# Patient Record
Sex: MALE | Race: WHITE | NOT HISPANIC OR LATINO | URBAN - METROPOLITAN AREA
[De-identification: names, ages, dates, MRNs, and addresses within clinical notes are randomized per-mention and may not be internally consistent; named-entity substitution may affect disease eponyms.]

---

## 2023-04-10 ENCOUNTER — HOSPITAL ENCOUNTER (OUTPATIENT)
Dept: GENERAL RADIOLOGY | Facility: HOSPITAL | Age: 68
Discharge: HOME | End: 2023-04-10
Payer: COMMERCIAL

## 2023-04-10 ENCOUNTER — PRE-ADMISSION TESTING (OUTPATIENT)
Dept: PREADMISSION TESTING | Facility: HOSPITAL | Age: 68
End: 2023-04-10
Payer: COMMERCIAL

## 2023-04-10 VITALS
WEIGHT: 315 LBS | TEMPERATURE: 98.2 F | OXYGEN SATURATION: 98 % | HEIGHT: 73 IN | SYSTOLIC BLOOD PRESSURE: 155 MMHG | RESPIRATION RATE: 18 BRPM | HEART RATE: 87 BPM | DIASTOLIC BLOOD PRESSURE: 75 MMHG | BODY MASS INDEX: 41.75 KG/M2

## 2023-04-10 LAB
ALBUMIN SERPL-MCNC: 4.2 G/DL (ref 3.5–5.2)
ALBUMIN/GLOB SERPL: 1.9 G/DL
ALP SERPL-CCNC: 64 U/L (ref 39–117)
ALT SERPL W P-5'-P-CCNC: 19 U/L (ref 1–41)
ANION GAP SERPL CALCULATED.3IONS-SCNC: 8.5 MMOL/L (ref 5–15)
AST SERPL-CCNC: 12 U/L (ref 1–40)
BACTERIA UR QL AUTO: NORMAL /HPF
BASOPHILS # BLD AUTO: 0.08 10*3/MM3 (ref 0–0.2)
BASOPHILS NFR BLD AUTO: 0.9 % (ref 0–1.5)
BILIRUB SERPL-MCNC: 0.4 MG/DL (ref 0–1.2)
BILIRUB UR QL STRIP: NEGATIVE
BUN SERPL-MCNC: 19 MG/DL (ref 8–23)
BUN/CREAT SERPL: 16.4 (ref 7–25)
CALCIUM SPEC-SCNC: 8.8 MG/DL (ref 8.6–10.5)
CHLORIDE SERPL-SCNC: 107 MMOL/L (ref 98–107)
CLARITY UR: CLEAR
CO2 SERPL-SCNC: 26.5 MMOL/L (ref 22–29)
COLOR UR: YELLOW
CREAT SERPL-MCNC: 1.16 MG/DL (ref 0.76–1.27)
DEPRECATED RDW RBC AUTO: 43.1 FL (ref 37–54)
EGFRCR SERPLBLD CKD-EPI 2021: 69 ML/MIN/1.73
EOSINOPHIL # BLD AUTO: 0.29 10*3/MM3 (ref 0–0.4)
EOSINOPHIL NFR BLD AUTO: 3.3 % (ref 0.3–6.2)
ERYTHROCYTE [DISTWIDTH] IN BLOOD BY AUTOMATED COUNT: 13.1 % (ref 12.3–15.4)
GLOBULIN UR ELPH-MCNC: 2.2 GM/DL
GLUCOSE SERPL-MCNC: 167 MG/DL (ref 65–99)
GLUCOSE UR STRIP-MCNC: NEGATIVE MG/DL
HCT VFR BLD AUTO: 44.3 % (ref 37.5–51)
HGB BLD-MCNC: 14.7 G/DL (ref 13–17.7)
HGB UR QL STRIP.AUTO: NEGATIVE
HYALINE CASTS UR QL AUTO: NORMAL /LPF
IMM GRANULOCYTES # BLD AUTO: 0.03 10*3/MM3 (ref 0–0.05)
IMM GRANULOCYTES NFR BLD AUTO: 0.3 % (ref 0–0.5)
INR PPP: 1.42 (ref 0.9–1.1)
KETONES UR QL STRIP: NEGATIVE
LEUKOCYTE ESTERASE UR QL STRIP.AUTO: NEGATIVE
LYMPHOCYTES # BLD AUTO: 2 10*3/MM3 (ref 0.7–3.1)
LYMPHOCYTES NFR BLD AUTO: 22.8 % (ref 19.6–45.3)
MCH RBC QN AUTO: 30.1 PG (ref 26.6–33)
MCHC RBC AUTO-ENTMCNC: 33.2 G/DL (ref 31.5–35.7)
MCV RBC AUTO: 90.6 FL (ref 79–97)
MONOCYTES # BLD AUTO: 0.71 10*3/MM3 (ref 0.1–0.9)
MONOCYTES NFR BLD AUTO: 8.1 % (ref 5–12)
NEUTROPHILS NFR BLD AUTO: 5.67 10*3/MM3 (ref 1.7–7)
NEUTROPHILS NFR BLD AUTO: 64.6 % (ref 42.7–76)
NITRITE UR QL STRIP: NEGATIVE
NRBC BLD AUTO-RTO: 0 /100 WBC (ref 0–0.2)
PH UR STRIP.AUTO: 5.5 [PH] (ref 5–8)
PLATELET # BLD AUTO: 277 10*3/MM3 (ref 140–450)
PMV BLD AUTO: 9.7 FL (ref 6–12)
POTASSIUM SERPL-SCNC: 4.4 MMOL/L (ref 3.5–5.2)
PROT SERPL-MCNC: 6.4 G/DL (ref 6–8.5)
PROT UR QL STRIP: NEGATIVE
PROTHROMBIN TIME: 17.6 SECONDS (ref 11.7–14.2)
RBC # BLD AUTO: 4.89 10*6/MM3 (ref 4.14–5.8)
RBC # UR STRIP: NORMAL /HPF
REF LAB TEST METHOD: NORMAL
SODIUM SERPL-SCNC: 142 MMOL/L (ref 136–145)
SP GR UR STRIP: 1.02 (ref 1–1.03)
SQUAMOUS #/AREA URNS HPF: NORMAL /HPF
UROBILINOGEN UR QL STRIP: NORMAL
WBC # UR STRIP: NORMAL /HPF
WBC NRBC COR # BLD: 8.78 10*3/MM3 (ref 3.4–10.8)

## 2023-04-10 PROCEDURE — 36415 COLL VENOUS BLD VENIPUNCTURE: CPT

## 2023-04-10 PROCEDURE — 81001 URINALYSIS AUTO W/SCOPE: CPT

## 2023-04-10 PROCEDURE — 71046 X-RAY EXAM CHEST 2 VIEWS: CPT

## 2023-04-10 PROCEDURE — 85610 PROTHROMBIN TIME: CPT

## 2023-04-10 PROCEDURE — 85025 COMPLETE CBC W/AUTO DIFF WBC: CPT

## 2023-04-10 PROCEDURE — 80053 COMPREHEN METABOLIC PANEL: CPT

## 2023-04-10 RX ORDER — WARFARIN SODIUM 6 MG/1
6 TABLET ORAL TAKE AS DIRECTED
COMMUNITY

## 2023-04-10 RX ORDER — SILDENAFIL 50 MG/1
1 TABLET, FILM COATED ORAL AS NEEDED
COMMUNITY
Start: 2023-02-20 | End: 2024-02-20

## 2023-04-10 RX ORDER — CELECOXIB 200 MG/1
200 CAPSULE ORAL DAILY
COMMUNITY
Start: 2023-03-23

## 2023-04-10 RX ORDER — MONTELUKAST SODIUM 10 MG/1
10 TABLET ORAL DAILY
COMMUNITY
Start: 2023-02-03

## 2023-04-10 RX ORDER — ALBUTEROL SULFATE 90 UG/1
2 AEROSOL, METERED RESPIRATORY (INHALATION) AS NEEDED
COMMUNITY
Start: 2023-03-23

## 2023-04-10 RX ORDER — FLUTICASONE FUROATE, UMECLIDINIUM BROMIDE AND VILANTEROL TRIFENATATE 100; 62.5; 25 UG/1; UG/1; UG/1
1 POWDER RESPIRATORY (INHALATION) DAILY
COMMUNITY

## 2023-04-10 RX ORDER — IBUPROFEN 800 MG/1
800 TABLET ORAL 3 TIMES DAILY PRN
COMMUNITY

## 2023-04-10 NOTE — DISCHARGE INSTRUCTIONS
Take the following medications the morning of surgery:  CARDIZEM, INHALERS      THE HOSPITAL WILL CALL YOU THE DAY BEFORE TO GIVE YOUR ARRIVAL TIME FOR SURGERY TO YOU.      If you are on prescription narcotic pain medication to control your pain you may also take that medication the morning of surgery.    General Instructions:  Do not eat solid food after midnight the night before surgery.  You may drink clear liquids day of surgery but must stop at least one hour before your hospital arrival time.  It is beneficial for you to have a clear drink that contains carbohydrates the day of surgery.  We suggest a 12 to 20 ounce bottle of Gatorade or Powerade for non-diabetic patients or a 12 to 20 ounce bottle of G2 or Powerade Zero for diabetic patients. (Pediatric patients, are not advised to drink a 12 to 20 ounce carbohydrate drink)    Clear liquids are liquids you can see through.  Nothing red in color.     Plain water                               Sports drinks  Sodas                                   Gelatin (Jell-O)  Fruit juices without pulp such as white grape juice and apple juice  Popsicles that contain no fruit or yogurt  Tea or coffee (no cream or milk added)  Gatorade / Powerade  G2 / Powerade Zero    Infants may have breast milk up to four hours before surgery.  Infants drinking formula may drink formula up to six hours before surgery.   Patients who avoid smoking, chewing tobacco and alcohol for 4 weeks prior to surgery have a reduced risk of post-operative complications.  Quit smoking as many days before surgery as you can.  Do not smoke, use chewing tobacco or drink alcohol the day of surgery.   If applicable bring your C-PAP/ BI-PAP machine.  Bring any papers given to you in the doctor’s office.  Wear clean comfortable clothes.  Do not wear contact lenses, false eyelashes or make-up.  Bring a case for your glasses.   Bring crutches or walker if applicable.  Remove all piercings.  Leave jewelry and any  other valuables at home.  Hair extensions with metal clips must be removed prior to surgery.  The Pre-Admission Testing nurse will instruct you to bring medications if unable to obtain an accurate list in Pre-Admission Testing.        If you were given a blood bank ID arm band remember to bring it with you the day of surgery.    Preventing a Surgical Site Infection:  For 2 to 3 days before surgery, avoid shaving with a razor because the razor can irritate skin and make it easier to develop an infection.    Any areas of open skin can increase the risk of a post-operative wound infection by allowing bacteria to enter and travel throughout the body.  Notify your surgeon if you have any skin wounds / rashes even if it is not near the expected surgical site.  The area will need assessed to determine if surgery should be delayed until it is healed.  The night prior to surgery shower using a fresh bar of anti-bacterial soap (such as Dial) and clean washcloth.  Sleep in a clean bed with clean clothing.  Do not allow pets to sleep with you.  Shower on the morning of surgery using a fresh bar of anti-bacterial soap (such as Dial) and clean washcloth.  Dry with a clean towel and dress in clean clothing.  Ask your surgeon if you will be receiving antibiotics prior to surgery.  Make sure you, your family, and all healthcare providers clean their hands with soap and water or an alcohol based hand  before caring for you or your wound.    Day of surgery:  Your arrival time is approximately two hours before your scheduled surgery time.  Upon arrival, a Pre-op nurse and Anesthesiologist will review your health history, obtain vital signs, and answer questions you may have.  The only belongings needed at this time will be a list of your home medications and if applicable your C-PAP/BI-PAP machine.  A Pre-op nurse will start an IV and you may receive medication in preparation for surgery, including something to help you relax.      Please be aware that surgery does come with discomfort.  We want to make every effort to control your discomfort so please discuss any uncontrolled symptoms with your nurse.   Your doctor will most likely have prescribed pain medications.      If you are going home after surgery you will receive individualized written care instructions before being discharged.  A responsible adult must drive you to and from the hospital on the day of your surgery and stay with you for 24 hours.  Discharge prescriptions can be filled by the hospital pharmacy during regular pharmacy hours.  If you are having surgery late in the day/evening your prescription may be e-prescribed to your pharmacy.  Please verify your pharmacy hours or chose a 24 hour pharmacy to avoid not having access to your prescription because your pharmacy has closed for the day.    If you are staying overnight following surgery, you will be transported to your hospital room following the recovery period.  [facility] has all private rooms.    If you have any questions please call Pre-Admission Testing at [phone].  Deductibles and co-payments are collected on the day of service. Please be prepared to pay the required co-pay, deductible or deposit on the day of service as defined by your plan.    Call your surgeon immediately if you experience any of the following symptoms:  Sore Throat  Shortness of Breath or difficulty breathing  Cough  Chills  Body soreness or muscle pain  Headache  Fever  New loss of taste or smell  Do not arrive for your surgery ill.  Your procedure will need to be rescheduled to another time.  You will need to call your physician before the day of surgery to avoid any unnecessary exposure to hospital staff as well as other patients.

## 2023-04-18 NOTE — H&P
HPI  Patient is a 67-year-old male here for evaluation of his right shoulder. The patient is a chief complaint of right shoulder pain that somewhat chronic in nature and recently worsened. He states he has difficulty with range of motion including overhead activity and sleeping on his right side    The pain is located over the anterolateral shoulder and the patient describes it as a intermittent, dull throbbing pain is worsened with overhead activity, lifting and occasionally at nighttime. The pain is mild to moderate in severity. The patient denies any numbness, tingling or weakness.  Physical Exam  Right shoulder  Skin is normal. There is no warmth. No erythema. Lymphadenopathy is negative. Right shoulder passive ROM today shows: Elevation = 100; ER(side) = 20; ER(abd) = 60; IR(abd) = 20; IR(vert) = pocket. Shoulder strength: Elevation = 4-/5; ER = 4-/5; IR = 5-/5; ABD = 4/5. Crepitation in the glenohumeral joint. There is tenderness in the. Arc of motion is positive. Pulses are normal. Normal sensation. Capillary refill is normal. Tender over the anterior and posterior glenohumeral joint to palpation. Empty can test is strongly positive unable to sustain elevation/flexion of his right shoulder.  Assessment / Plan  1. Pain of right shoulder joint  M25.511: Pain in right shoulder  XR, SHOULDER, 2 OR MORE VIEW  Side: RIGHT    2. Osteoarthritis of right glenohumeral joint  M19.011: Primary osteoarthritis, right shoulder    XR, SHOULDER, 2 OR MORE VIEW  Side: RIGHT  Result:  - XRAY SHOULDER 2+ VIEW: Performed  Result Note: Severe degenerative changes of the glenohumeral joint with inferior spurring of the humeral head  Patient Instructions  At this point I would like to order an MRI scan to evaluate the rotator cuff integrity prior to scheduling surgery. My recommendation will be to proceed with a right total shoulder arthroplasty expecting that his rotator cuff remains in good shape. We discussed the procedure in  detail. We discussed the risk of the surgery that include infection, nerve injury, instability or failure of the implant requiring further surgery amongst other possible complications. We discussed the need for physical therapy and the lengthy recovery. For this procedure. All questions were answered and informed consent was obtained.

## 2023-04-19 ENCOUNTER — APPOINTMENT (OUTPATIENT)
Dept: GENERAL RADIOLOGY | Facility: HOSPITAL | Age: 68
End: 2023-04-19
Payer: COMMERCIAL

## 2023-04-19 ENCOUNTER — HOSPITAL ENCOUNTER (OUTPATIENT)
Facility: HOSPITAL | Age: 68
Setting detail: OBSERVATION
LOS: 1 days | Discharge: HOME | End: 2023-04-20
Attending: ORTHOPAEDIC SURGERY | Admitting: ORTHOPAEDIC SURGERY
Payer: COMMERCIAL

## 2023-04-19 ENCOUNTER — ANESTHESIA EVENT (OUTPATIENT)
Dept: PERIOP | Facility: HOSPITAL | Age: 68
End: 2023-04-19
Payer: COMMERCIAL

## 2023-04-19 ENCOUNTER — ANESTHESIA (OUTPATIENT)
Dept: PERIOP | Facility: HOSPITAL | Age: 68
End: 2023-04-19
Payer: COMMERCIAL

## 2023-04-19 PROBLEM — Z96.611 S/P REVERSE TOTAL SHOULDER ARTHROPLASTY, RIGHT: Status: ACTIVE | Noted: 2023-04-19

## 2023-04-19 PROBLEM — M19.90 OA (OSTEOARTHRITIS): Status: ACTIVE | Noted: 2023-04-19

## 2023-04-19 LAB
INR PPP: 1.03 (ref 0.9–1.1)
PROTHROMBIN TIME: 13.6 SECONDS (ref 11.7–14.2)

## 2023-04-19 PROCEDURE — L3670 SO ACRO/CLAV CAN WEB PRE OTS: HCPCS | Performed by: ORTHOPAEDIC SURGERY

## 2023-04-19 PROCEDURE — 25010000002 FENTANYL CITRATE (PF) 50 MCG/ML SOLUTION: Performed by: STUDENT IN AN ORGANIZED HEALTH CARE EDUCATION/TRAINING PROGRAM

## 2023-04-19 PROCEDURE — 0 POTASSIUM CHLORIDE PER 2 MEQ: Performed by: ORTHOPAEDIC SURGERY

## 2023-04-19 PROCEDURE — G0378 HOSPITAL OBSERVATION PER HR: HCPCS

## 2023-04-19 PROCEDURE — 94761 N-INVAS EAR/PLS OXIMETRY MLT: CPT

## 2023-04-19 PROCEDURE — 85610 PROTHROMBIN TIME: CPT | Performed by: ORTHOPAEDIC SURGERY

## 2023-04-19 PROCEDURE — 25010000002 DEXAMETHASONE PER 1 MG: Performed by: STUDENT IN AN ORGANIZED HEALTH CARE EDUCATION/TRAINING PROGRAM

## 2023-04-19 PROCEDURE — 25010000002 ROPIVACAINE PER 1 MG: Performed by: STUDENT IN AN ORGANIZED HEALTH CARE EDUCATION/TRAINING PROGRAM

## 2023-04-19 PROCEDURE — 94640 AIRWAY INHALATION TREATMENT: CPT

## 2023-04-19 PROCEDURE — 25010000002 MIDAZOLAM PER 1 MG: Performed by: STUDENT IN AN ORGANIZED HEALTH CARE EDUCATION/TRAINING PROGRAM

## 2023-04-19 PROCEDURE — 25010000002 PROPOFOL 10 MG/ML EMULSION

## 2023-04-19 PROCEDURE — 25010000002 ONDANSETRON PER 1 MG

## 2023-04-19 PROCEDURE — 73020 X-RAY EXAM OF SHOULDER: CPT

## 2023-04-19 PROCEDURE — C1776 JOINT DEVICE (IMPLANTABLE): HCPCS | Performed by: ORTHOPAEDIC SURGERY

## 2023-04-19 PROCEDURE — 94799 UNLISTED PULMONARY SVC/PX: CPT

## 2023-04-19 DEVICE — TRY HUM/SHLDR COMPREHENSIVE/REVERSE MINI COCR STD PLS6: Type: IMPLANTABLE DEVICE | Site: SHOULDER | Status: FUNCTIONAL

## 2023-04-19 DEVICE — SCRW FIX LK HEX 4.75X20MM: Type: IMPLANTABLE DEVICE | Site: SHOULDER | Status: FUNCTIONAL

## 2023-04-19 DEVICE — IMPLANTABLE DEVICE
Type: IMPLANTABLE DEVICE | Site: SHOULDER | Status: FUNCTIONAL
Brand: COMPREHENSIVE REVERSE SHOULDER

## 2023-04-19 DEVICE — TOTL SHLDER REV: Type: IMPLANTABLE DEVICE | Status: FUNCTIONAL

## 2023-04-19 DEVICE — STEM HUM/SHLDR COMPREHENSIVE MINI 14X83MM: Type: IMPLANTABLE DEVICE | Site: SHOULDER | Status: FUNCTIONAL

## 2023-04-19 DEVICE — SCRW FIX LK HEX 4.75X15MM: Type: IMPLANTABLE DEVICE | Site: SHOULDER | Status: FUNCTIONAL

## 2023-04-19 DEVICE — SCRW COMPRNSV CNTRL 6.5X25MM REUS: Type: IMPLANTABLE DEVICE | Site: SHOULDER | Status: FUNCTIONAL

## 2023-04-19 DEVICE — KNOTLESS TISSUE CONTROL DEVICE, UNDYED UNIDIRECTIONAL (ANTIBACTERIAL) SYNTHETIC ABSORBABLE DEVICE
Type: IMPLANTABLE DEVICE | Site: SHOULDER | Status: FUNCTIONAL
Brand: STRATAFIX

## 2023-04-19 DEVICE — BEAR HUM PROLNG STD 36MM: Type: IMPLANTABLE DEVICE | Site: SHOULDER | Status: FUNCTIONAL

## 2023-04-19 RX ORDER — ACETAMINOPHEN 650 MG/1
650 SUPPOSITORY RECTAL EVERY 4 HOURS PRN
Status: DISCONTINUED | OUTPATIENT
Start: 2023-04-19 | End: 2023-04-20 | Stop reason: HOSPADM

## 2023-04-19 RX ORDER — DILTIAZEM HYDROCHLORIDE 240 MG/1
240 CAPSULE, COATED, EXTENDED RELEASE ORAL EVERY MORNING
Status: DISCONTINUED | OUTPATIENT
Start: 2023-04-19 | End: 2023-04-20 | Stop reason: HOSPADM

## 2023-04-19 RX ORDER — ONDANSETRON 2 MG/ML
INJECTION INTRAMUSCULAR; INTRAVENOUS AS NEEDED
Status: DISCONTINUED | OUTPATIENT
Start: 2023-04-19 | End: 2023-04-19 | Stop reason: SURG

## 2023-04-19 RX ORDER — ALBUTEROL SULFATE 90 UG/1
2 AEROSOL, METERED RESPIRATORY (INHALATION) AS NEEDED
Status: DISCONTINUED | OUTPATIENT
Start: 2023-04-19 | End: 2023-04-20 | Stop reason: HOSPADM

## 2023-04-19 RX ORDER — FENTANYL CITRATE 50 UG/ML
50 INJECTION, SOLUTION INTRAMUSCULAR; INTRAVENOUS
Status: DISCONTINUED | OUTPATIENT
Start: 2023-04-19 | End: 2023-04-19 | Stop reason: HOSPADM

## 2023-04-19 RX ORDER — MAGNESIUM HYDROXIDE 1200 MG/15ML
LIQUID ORAL AS NEEDED
Status: DISCONTINUED | OUTPATIENT
Start: 2023-04-19 | End: 2023-04-19 | Stop reason: HOSPADM

## 2023-04-19 RX ORDER — WARFARIN SODIUM 6 MG/1
6 TABLET ORAL
Status: DISCONTINUED | OUTPATIENT
Start: 2023-04-20 | End: 2023-04-20 | Stop reason: HOSPADM

## 2023-04-19 RX ORDER — SODIUM CHLORIDE 0.9 % (FLUSH) 0.9 %
3-10 SYRINGE (ML) INJECTION AS NEEDED
Status: DISCONTINUED | OUTPATIENT
Start: 2023-04-19 | End: 2023-04-19 | Stop reason: HOSPADM

## 2023-04-19 RX ORDER — OXYCODONE HYDROCHLORIDE AND ACETAMINOPHEN 5; 325 MG/1; MG/1
1 TABLET ORAL EVERY 4 HOURS PRN
Status: DISCONTINUED | OUTPATIENT
Start: 2023-04-19 | End: 2023-04-20 | Stop reason: HOSPADM

## 2023-04-19 RX ORDER — CLINDAMYCIN PHOSPHATE 900 MG/50ML
900 INJECTION INTRAVENOUS EVERY 8 HOURS
Status: COMPLETED | OUTPATIENT
Start: 2023-04-19 | End: 2023-04-20

## 2023-04-19 RX ORDER — HYDRALAZINE HYDROCHLORIDE 20 MG/ML
5 INJECTION INTRAMUSCULAR; INTRAVENOUS
Status: DISCONTINUED | OUTPATIENT
Start: 2023-04-19 | End: 2023-04-19 | Stop reason: HOSPADM

## 2023-04-19 RX ORDER — DEXAMETHASONE SODIUM PHOSPHATE 4 MG/ML
INJECTION, SOLUTION INTRA-ARTICULAR; INTRALESIONAL; INTRAMUSCULAR; INTRAVENOUS; SOFT TISSUE
Status: COMPLETED | OUTPATIENT
Start: 2023-04-19 | End: 2023-04-19

## 2023-04-19 RX ORDER — ACETAMINOPHEN 500 MG
1000 TABLET ORAL ONCE
Status: COMPLETED | OUTPATIENT
Start: 2023-04-19 | End: 2023-04-19

## 2023-04-19 RX ORDER — HYDROCODONE BITARTRATE AND ACETAMINOPHEN 7.5; 325 MG/1; MG/1
1 TABLET ORAL ONCE AS NEEDED
Status: DISCONTINUED | OUTPATIENT
Start: 2023-04-19 | End: 2023-04-19 | Stop reason: HOSPADM

## 2023-04-19 RX ORDER — LIDOCAINE HYDROCHLORIDE 20 MG/ML
INJECTION, SOLUTION EPIDURAL; INFILTRATION; INTRACAUDAL; PERINEURAL AS NEEDED
Status: DISCONTINUED | OUTPATIENT
Start: 2023-04-19 | End: 2023-04-19 | Stop reason: SURG

## 2023-04-19 RX ORDER — PROMETHAZINE HYDROCHLORIDE 25 MG/1
25 TABLET ORAL ONCE AS NEEDED
Status: DISCONTINUED | OUTPATIENT
Start: 2023-04-19 | End: 2023-04-19 | Stop reason: HOSPADM

## 2023-04-19 RX ORDER — NALOXONE HCL 0.4 MG/ML
0.2 VIAL (ML) INJECTION AS NEEDED
Status: DISCONTINUED | OUTPATIENT
Start: 2023-04-19 | End: 2023-04-19 | Stop reason: HOSPADM

## 2023-04-19 RX ORDER — OXYCODONE HYDROCHLORIDE AND ACETAMINOPHEN 5; 325 MG/1; MG/1
2 TABLET ORAL EVERY 4 HOURS PRN
Status: DISCONTINUED | OUTPATIENT
Start: 2023-04-19 | End: 2023-04-20 | Stop reason: HOSPADM

## 2023-04-19 RX ORDER — PROMETHAZINE HYDROCHLORIDE 25 MG/1
25 SUPPOSITORY RECTAL ONCE AS NEEDED
Status: DISCONTINUED | OUTPATIENT
Start: 2023-04-19 | End: 2023-04-19 | Stop reason: HOSPADM

## 2023-04-19 RX ORDER — SODIUM CHLORIDE, SODIUM LACTATE, POTASSIUM CHLORIDE, CALCIUM CHLORIDE 600; 310; 30; 20 MG/100ML; MG/100ML; MG/100ML; MG/100ML
9 INJECTION, SOLUTION INTRAVENOUS CONTINUOUS
Status: DISCONTINUED | OUTPATIENT
Start: 2023-04-19 | End: 2023-04-20 | Stop reason: HOSPADM

## 2023-04-19 RX ORDER — IPRATROPIUM BROMIDE AND ALBUTEROL SULFATE 2.5; .5 MG/3ML; MG/3ML
3 SOLUTION RESPIRATORY (INHALATION) ONCE AS NEEDED
Status: DISCONTINUED | OUTPATIENT
Start: 2023-04-19 | End: 2023-04-19 | Stop reason: HOSPADM

## 2023-04-19 RX ORDER — DROPERIDOL 2.5 MG/ML
0.62 INJECTION, SOLUTION INTRAMUSCULAR; INTRAVENOUS
Status: DISCONTINUED | OUTPATIENT
Start: 2023-04-19 | End: 2023-04-19 | Stop reason: HOSPADM

## 2023-04-19 RX ORDER — WARFARIN SODIUM 6 MG/1
9 TABLET ORAL
Status: DISCONTINUED | OUTPATIENT
Start: 2023-04-19 | End: 2023-04-19

## 2023-04-19 RX ORDER — WARFARIN SODIUM 6 MG/1
9 TABLET ORAL
Status: DISCONTINUED | OUTPATIENT
Start: 2023-04-19 | End: 2023-04-20 | Stop reason: HOSPADM

## 2023-04-19 RX ORDER — ROPIVACAINE HYDROCHLORIDE 5 MG/ML
INJECTION, SOLUTION EPIDURAL; INFILTRATION; PERINEURAL
Status: COMPLETED | OUTPATIENT
Start: 2023-04-19 | End: 2023-04-19

## 2023-04-19 RX ORDER — MIDAZOLAM HYDROCHLORIDE 1 MG/ML
1 INJECTION INTRAMUSCULAR; INTRAVENOUS
Status: DISCONTINUED | OUTPATIENT
Start: 2023-04-19 | End: 2023-04-19 | Stop reason: HOSPADM

## 2023-04-19 RX ORDER — SODIUM CHLORIDE 0.9 % (FLUSH) 0.9 %
3 SYRINGE (ML) INJECTION EVERY 12 HOURS SCHEDULED
Status: DISCONTINUED | OUTPATIENT
Start: 2023-04-19 | End: 2023-04-19 | Stop reason: HOSPADM

## 2023-04-19 RX ORDER — SODIUM CHLORIDE 9 MG/ML
INJECTION, SOLUTION INTRAVENOUS AS NEEDED
Status: DISCONTINUED | OUTPATIENT
Start: 2023-04-19 | End: 2023-04-19 | Stop reason: HOSPADM

## 2023-04-19 RX ORDER — ROCURONIUM BROMIDE 10 MG/ML
INJECTION, SOLUTION INTRAVENOUS AS NEEDED
Status: DISCONTINUED | OUTPATIENT
Start: 2023-04-19 | End: 2023-04-19 | Stop reason: SURG

## 2023-04-19 RX ORDER — LIDOCAINE HYDROCHLORIDE 10 MG/ML
0.5 INJECTION, SOLUTION EPIDURAL; INFILTRATION; INTRACAUDAL; PERINEURAL ONCE AS NEEDED
Status: DISCONTINUED | OUTPATIENT
Start: 2023-04-19 | End: 2023-04-19 | Stop reason: HOSPADM

## 2023-04-19 RX ORDER — DOCUSATE SODIUM 100 MG/1
100 CAPSULE, LIQUID FILLED ORAL 2 TIMES DAILY PRN
Status: DISCONTINUED | OUTPATIENT
Start: 2023-04-19 | End: 2023-04-20 | Stop reason: HOSPADM

## 2023-04-19 RX ORDER — SODIUM CHLORIDE AND POTASSIUM CHLORIDE 150; 450 MG/100ML; MG/100ML
50 INJECTION, SOLUTION INTRAVENOUS CONTINUOUS
Status: DISCONTINUED | OUTPATIENT
Start: 2023-04-19 | End: 2023-04-20 | Stop reason: HOSPADM

## 2023-04-19 RX ORDER — LISINOPRIL 5 MG/1
5 TABLET ORAL EVERY MORNING
Status: DISCONTINUED | OUTPATIENT
Start: 2023-04-19 | End: 2023-04-20 | Stop reason: HOSPADM

## 2023-04-19 RX ORDER — PROPOFOL 10 MG/ML
VIAL (ML) INTRAVENOUS AS NEEDED
Status: DISCONTINUED | OUTPATIENT
Start: 2023-04-19 | End: 2023-04-19 | Stop reason: SURG

## 2023-04-19 RX ORDER — CELECOXIB 200 MG/1
200 CAPSULE ORAL DAILY
Status: DISCONTINUED | OUTPATIENT
Start: 2023-04-19 | End: 2023-04-20 | Stop reason: HOSPADM

## 2023-04-19 RX ORDER — LABETALOL HYDROCHLORIDE 5 MG/ML
5 INJECTION, SOLUTION INTRAVENOUS
Status: DISCONTINUED | OUTPATIENT
Start: 2023-04-19 | End: 2023-04-19 | Stop reason: HOSPADM

## 2023-04-19 RX ORDER — ACETAMINOPHEN 325 MG/1
650 TABLET ORAL EVERY 4 HOURS PRN
Status: DISCONTINUED | OUTPATIENT
Start: 2023-04-19 | End: 2023-04-20 | Stop reason: HOSPADM

## 2023-04-19 RX ORDER — MONTELUKAST SODIUM 10 MG/1
10 TABLET ORAL DAILY
Status: DISCONTINUED | OUTPATIENT
Start: 2023-04-19 | End: 2023-04-20 | Stop reason: HOSPADM

## 2023-04-19 RX ORDER — OXYCODONE AND ACETAMINOPHEN 7.5; 325 MG/1; MG/1
1 TABLET ORAL EVERY 4 HOURS PRN
Status: DISCONTINUED | OUTPATIENT
Start: 2023-04-19 | End: 2023-04-19 | Stop reason: HOSPADM

## 2023-04-19 RX ORDER — ONDANSETRON 2 MG/ML
4 INJECTION INTRAMUSCULAR; INTRAVENOUS ONCE AS NEEDED
Status: DISCONTINUED | OUTPATIENT
Start: 2023-04-19 | End: 2023-04-19 | Stop reason: HOSPADM

## 2023-04-19 RX ORDER — NALOXONE HCL 0.4 MG/ML
0.1 VIAL (ML) INJECTION
Status: DISCONTINUED | OUTPATIENT
Start: 2023-04-19 | End: 2023-04-20 | Stop reason: HOSPADM

## 2023-04-19 RX ORDER — EPHEDRINE SULFATE 50 MG/ML
5 INJECTION, SOLUTION INTRAVENOUS ONCE AS NEEDED
Status: DISCONTINUED | OUTPATIENT
Start: 2023-04-19 | End: 2023-04-19 | Stop reason: HOSPADM

## 2023-04-19 RX ORDER — BUDESONIDE AND FORMOTEROL FUMARATE DIHYDRATE 160; 4.5 UG/1; UG/1
2 AEROSOL RESPIRATORY (INHALATION)
Status: DISCONTINUED | OUTPATIENT
Start: 2023-04-19 | End: 2023-04-20 | Stop reason: HOSPADM

## 2023-04-19 RX ORDER — DIPHENHYDRAMINE HYDROCHLORIDE 50 MG/ML
12.5 INJECTION INTRAMUSCULAR; INTRAVENOUS
Status: DISCONTINUED | OUTPATIENT
Start: 2023-04-19 | End: 2023-04-19 | Stop reason: HOSPADM

## 2023-04-19 RX ORDER — HYDROMORPHONE HYDROCHLORIDE 1 MG/ML
0.5 INJECTION, SOLUTION INTRAMUSCULAR; INTRAVENOUS; SUBCUTANEOUS
Status: DISCONTINUED | OUTPATIENT
Start: 2023-04-19 | End: 2023-04-19 | Stop reason: HOSPADM

## 2023-04-19 RX ORDER — CLINDAMYCIN PHOSPHATE 900 MG/50ML
900 INJECTION INTRAVENOUS EVERY 8 HOURS
Status: COMPLETED | OUTPATIENT
Start: 2023-04-19 | End: 2023-04-19

## 2023-04-19 RX ORDER — PROMETHAZINE HYDROCHLORIDE 25 MG/1
25 TABLET ORAL EVERY 6 HOURS PRN
Status: DISCONTINUED | OUTPATIENT
Start: 2023-04-19 | End: 2023-04-20 | Stop reason: HOSPADM

## 2023-04-19 RX ORDER — PHENYLEPHRINE HCL IN 0.9% NACL 1 MG/10 ML
SYRINGE (ML) INTRAVENOUS AS NEEDED
Status: DISCONTINUED | OUTPATIENT
Start: 2023-04-19 | End: 2023-04-19 | Stop reason: SURG

## 2023-04-19 RX ORDER — FLUMAZENIL 0.1 MG/ML
0.2 INJECTION INTRAVENOUS AS NEEDED
Status: DISCONTINUED | OUTPATIENT
Start: 2023-04-19 | End: 2023-04-19 | Stop reason: HOSPADM

## 2023-04-19 RX ADMIN — ROPIVACAINE HYDROCHLORIDE 24 ML: 5 INJECTION EPIDURAL; INFILTRATION; PERINEURAL at 06:30

## 2023-04-19 RX ADMIN — PROPOFOL 200 MG: 10 INJECTION, EMULSION INTRAVENOUS at 07:11

## 2023-04-19 RX ADMIN — ONDANSETRON 4 MG: 2 INJECTION INTRAMUSCULAR; INTRAVENOUS at 08:16

## 2023-04-19 RX ADMIN — CLINDAMYCIN IN 5 PERCENT DEXTROSE 900 MG: 18 INJECTION, SOLUTION INTRAVENOUS at 21:31

## 2023-04-19 RX ADMIN — ROCURONIUM BROMIDE 20 MG: 10 INJECTION, SOLUTION INTRAVENOUS at 08:00

## 2023-04-19 RX ADMIN — DEXAMETHASONE SODIUM PHOSPHATE 4 MG: 4 INJECTION, SOLUTION INTRAMUSCULAR; INTRAVENOUS at 06:30

## 2023-04-19 RX ADMIN — SODIUM CHLORIDE, POTASSIUM CHLORIDE, SODIUM LACTATE AND CALCIUM CHLORIDE 9 ML/HR: 600; 310; 30; 20 INJECTION, SOLUTION INTRAVENOUS at 06:45

## 2023-04-19 RX ADMIN — LIDOCAINE HYDROCHLORIDE 100 MG: 20 INJECTION, SOLUTION EPIDURAL; INFILTRATION; INTRACAUDAL; PERINEURAL at 07:11

## 2023-04-19 RX ADMIN — ROCURONIUM BROMIDE 50 MG: 10 INJECTION, SOLUTION INTRAVENOUS at 07:11

## 2023-04-19 RX ADMIN — OXYCODONE AND ACETAMINOPHEN 2 TABLET: 5; 325 TABLET ORAL at 21:30

## 2023-04-19 RX ADMIN — DILTIAZEM HYDROCHLORIDE 240 MG: 240 CAPSULE, COATED, EXTENDED RELEASE ORAL at 12:57

## 2023-04-19 RX ADMIN — SUGAMMADEX 400 MG: 100 INJECTION, SOLUTION INTRAVENOUS at 08:41

## 2023-04-19 RX ADMIN — FENTANYL CITRATE 50 MCG: 50 INJECTION, SOLUTION INTRAMUSCULAR; INTRAVENOUS at 06:30

## 2023-04-19 RX ADMIN — Medication 100 MCG: at 08:16

## 2023-04-19 RX ADMIN — POTASSIUM CHLORIDE AND SODIUM CHLORIDE 50 ML/HR: 450; 150 INJECTION, SOLUTION INTRAVENOUS at 12:50

## 2023-04-19 RX ADMIN — LISINOPRIL 5 MG: 5 TABLET ORAL at 12:57

## 2023-04-19 RX ADMIN — WARFARIN 9 MG: 6 TABLET ORAL at 21:30

## 2023-04-19 RX ADMIN — ACETAMINOPHEN 1000 MG: 500 TABLET, FILM COATED ORAL at 06:21

## 2023-04-19 RX ADMIN — CLINDAMYCIN IN 5 PERCENT DEXTROSE 900 MG: 18 INJECTION, SOLUTION INTRAVENOUS at 13:01

## 2023-04-19 RX ADMIN — CELECOXIB 200 MG: 200 CAPSULE ORAL at 12:56

## 2023-04-19 RX ADMIN — MONTELUKAST SODIUM 10 MG: 10 TABLET, FILM COATED ORAL at 12:57

## 2023-04-19 RX ADMIN — BUDESONIDE AND FORMOTEROL FUMARATE DIHYDRATE 2 PUFF: 160; 4.5 AEROSOL RESPIRATORY (INHALATION) at 21:42

## 2023-04-19 RX ADMIN — MIDAZOLAM 2 MG: 1 INJECTION INTRAMUSCULAR; INTRAVENOUS at 06:30

## 2023-04-19 RX ADMIN — CLINDAMYCIN PHOSPHATE 900 MG: 900 INJECTION, SOLUTION INTRAVENOUS at 06:51

## 2023-04-19 RX ADMIN — Medication 100 MCG: at 08:30

## 2023-04-19 NOTE — ANESTHESIA PROCEDURE NOTES
Airway  Urgency: elective    Date/Time: 4/19/2023 7:13 AM  Airway not difficult    General Information and Staff    Patient location during procedure: OR  CRNA/CAA: Estrada Chinchilla CRNA    Indications and Patient Condition  Indications for airway management: airway protection    Preoxygenated: yes  Mask difficulty assessment: 2 - vent by mask + OA or adjuvant +/- NMBA    Final Airway Details  Final airway type: endotracheal airway      Successful airway: ETT  Cuffed: yes   Successful intubation technique: direct laryngoscopy  Facilitating devices/methods: intubating stylet  Endotracheal tube insertion site: oral  Blade: Anastasia  Blade size: 3  ETT size (mm): 7.5  Cormack-Lehane Classification: grade I - full view of glottis  Placement verified by: chest auscultation and capnometry   Measured from: teeth  ETT/EBT  to teeth (cm): 22  Number of attempts at approach: 1  Assessment: lips, teeth, and gum same as pre-op and atraumatic intubation

## 2023-04-19 NOTE — ANESTHESIA POSTPROCEDURE EVALUATION
Patient: Edin Mariano    Procedure Summary     Date: 04/19/23 Room / Location: [facility] OR [address] [place] [address] [facility]    Anesthesia Start: 0657 Anesthesia Stop: 0901    Procedure: RIGHT REVERSE TOTAL SHOULDER ARTHROPLASTY (Right: Shoulder) Diagnosis:     Surgeons: Mateusz Man MD Provider: Isaac Fernando MD    Anesthesia Type: general ASA Status: 3          Anesthesia Type: general    Vitals  Vitals Value Taken Time   /80 04/19/23 0945   Temp 36.4 °C (97.6 °F) 04/19/23 0900   Pulse 68 04/19/23 0953   Resp 18 04/19/23 0945   SpO2 95 % 04/19/23 0953   Vitals shown include unvalidated device data.        Post Anesthesia Care and Evaluation    Patient location during evaluation: PACU  Patient participation: complete - patient participated  Level of consciousness: awake  Pain management: adequate    Airway patency: patent  Anesthetic complications: No anesthetic complications  PONV Status: none  Cardiovascular status: acceptable  Respiratory status: acceptable  Hydration status: acceptable    Comments: Patient seen and examined postoperatively; vital signs stable; SpO2 greater than or equal to 90%; cardiopulmonary status stable; nausea/vomiting adequately controlled; pain adequately controlled; no apparent anesthesia complications; patient discharged from anesthesia care when discharge criteria were met

## 2023-04-19 NOTE — ADDENDUM NOTE
Addendum  created 04/19/23 1014 by Isaac Fernando MD    Attestation recorded in Intraprocedure, Intraprocedure Attestations filed

## 2023-04-19 NOTE — OP NOTE
TOTAL SHOULDER REVERSE ARTHROPLASTY  Procedure Note    Edin Mariano  4/19/2023    Pre-op Diagnosis:   1.  Right rotator cuff arthropathy  2.   3.     Post-op Diagnosis:     1.  Same  2.   3.     Procedure(s):  1.  Right reverse total shoulder arthroplasty  2.   3.   4.     Surgeon(s):  Mateusz Man MD    Anesthesia: General with Block  Anesthesiologist: Isaac Fernando MD  CRNA: Estrada Chinchilla CRNA    Staff:   Circulator: Noemi Messina RN  Scrub Person: Tran Boyer  Vendor Representative: Rubin Stout  Assistant: Estrada William PA-C      Drains: None    Estimated Blood Loss: minimal    Specimen:   Order Name Source Comment Collection Info Order Time   PROTIME-INR   Collected By: Clara Ramirez RN 4/19/2023  6:13 AM       Description of Procedure: Following induction of anesthesia the patient was placed in the beachchair position.  The right shoulder was then prepped and draped in a sterile fashion.  I marked the skin for deltopectoral approach and cover the skin with Ioban.  I incised beginning just proximal and lateral to the coracoid process toward the axillary fold.  Subcutaneous tissue was bluntly dissected exposing the deltopectoral interval.  The cephalic vein and deltoid were retracted laterally while the pectoralis was retracted medially.  I released the superior 2 cm of the pectoralis tendon.  The clavipectoral fascia was incised and the strap muscles were retracted medially.  The coracohumeral ligament was then sharply released.  The humeral head was found to be high riding and in contact with the coracoid process.  I used the cautery to perform a T-shaped capsulotomy releasing the superior scarred capsule and the anterior remnant of the subscapularis.  I then released the capsule inferiorly allowing us to actually rotate the articular surface into the operative field.  I then placed the cutting guide for the Biomet comprehensive system against the anterior cortex of the humerus  and pinned it in place.  We made our proximal humeral cut with the oscillating saw.  We then prepared the proximal humeral canal for implantation of the stem.  Using first the T-handled awl and then broaches we found that the size 14 mini comprehensive broach gave the best fit of the surrounding bone.  The broach was left in place while we address the glenoid.     I placed 2 posterior and 1 anterior glenoid retractor.  He was found to have some mild anterior wear and some scapular protraction but the glenoid was in otherwise good condition.  I placed the guide for the baseplate into the central portion of the glenoid and placed our Steinmann pin to the appropriate coronal angle into the appropriate depth.  We then overreamed the Steinmann pin then impacted our baseplate into place.  The baseplate was then secured with 1 large central and 4 peripheral locking screws.      I then performed a trial placement of the glenosphere and found that the C offset position rotated posteriorly gave the best coverage of the glenoid surface.  We then dried the trunnion of the baseplate and impacted our glenosphere into place.  I performed a trial reduction with the trial stem and found that the +6 offset standard thickness tray with standard bearing gave good range of motion and stability.      We then removed the trial stem, irrigated and dried the proximal humeral canal then impacted our size 14 mini comprehensive stem into place.  I then repeated our trial reduction and again found the +6 offset tray with standard thickness bearing gave good stability and range of motion.  We removed the trial tray, dried the trunnion of the humeral stem then impacted our tray and bearing into place.      The shoulder was then reduced and extensively irrigated.  We then irrigated and performed closure using 0 Vicryl in a running fashion to close the deltopectoral interval, 2-0 strata fix to close subcutaneous tissue and staples to close the  skin.  He was then placed into a soft sterile dressing in his postop sling.  He will be discharged to recovery and then home and his prognosis is good.     Estrada William PA-C assisted me in this procedure.  His presence was necessary to help with holding the patient's limb and the retractors.  He allowed me to perform the procedure in a much quicker fashion resulting in less morbidity and risk for the patient.  An extra set of skilled sugical hands was necessary to effectively perform this procedure.     Findings: See Dictation     Complications: None        Mateusz Man MD    Findings: See Dictation    Complications: None      Mateusz Man MD     Date: 4/19/2023  Time: 08:34 EDT

## 2023-04-19 NOTE — ANESTHESIA PREPROCEDURE EVALUATION
Anesthesia Evaluation     Patient summary reviewed and Nursing notes reviewed   no history of anesthetic complications:  NPO Solid Status: > 8 hours  NPO Liquid Status: > 2 hours           Airway   Mallampati: II  TM distance: >3 FB  Neck ROM: full  Large neck circumference  Comment: Possible difficult mask  Dental    (+) poor dentition    Pulmonary    (+) COPD, sleep apnea,   Cardiovascular     ECG reviewed    (+) hypertension, dysrhythmias Paroxysmal Atrial Fib,       Neuro/Psych  GI/Hepatic/Renal/Endo    (+) morbid obesity,      Musculoskeletal     Abdominal    Substance History      OB/GYN          Other   arthritis,                    Anesthesia Plan    ASA 3     general     intravenous induction     Anesthetic plan, risks, benefits, and alternatives have been provided, discussed and informed consent has been obtained with: patient.        CODE STATUS:

## 2023-04-19 NOTE — PROGRESS NOTES
[facility] Clinical Pharmacy Services: Warfarin Dosing/Monitoring Consult    Edin Mariano is a 67 y.o. male, estimated creatinine clearance is 97 mL/min (by C-G formula based on SCr of 1.16 mg/dL). Weighing 157kg  .    Results from last 7 days   Lab Units 04/19/23  0615   INR  1.03     Prior to admission anticoagulation: H/o afib anticoagulated on warfarin. As of 3/24/23, cardiologist noted regimen as Warfarin 9mg M/W/F and 6mg all other days    Hospital Anticoagulation:  Consulting provider: Dr. Man  Start date: 4/19/23  Indication: A Fib - requiring full anticoagulation  Target INR: 2.5 - 3.5  Expected duration: Indefinite   Bridge Therapy: No      Potential food or drug interactions:   Celecoxib - may result in increased INR d/t metabolism by CYP2C9, close monitoring required.    Education complete?/Date: No; plan for follow up TBD    Assessment/Plan:    Resuming warfarin following right reverse total shoulder arthoplasty. Last dose noted as 4/13. INR 1.03 this AM.    Dose: Will resume previously documented home regimen - Warfarin 9mg MWF and Warfarin 6mg all other days. Likely will not see INR increases for at least 3 days. If after 3 days INR remains labile, will increase dosing.  Monitor for any signs or symptoms of bleeding  Follow up daily INRs and dose adjustments    Pharmacy will continue to follow until discharge or discontinuation of warfarin.     Calvin Medina, [facility]  Clinical Pharmacist

## 2023-04-19 NOTE — PLAN OF CARE
Goal Outcome Evaluation:POD0 Right reverse total shoulder, VSS, afebrile, neuro intact, able to wiggle fingers with strong , voiding well, tolerating po diet, coumadin scheduled this evening, anticipate DC tomorrow.

## 2023-04-19 NOTE — ANESTHESIA PROCEDURE NOTES
Peripheral Block    Pre-sedation assessment completed: 4/19/2023 6:29 AM    Patient reassessed immediately prior to procedure    Patient location during procedure: pre-op  Start time: 4/19/2023 6:30 AM  Stop time: 4/19/2023 6:35 AM  Reason for block: at surgeon's request and post-op pain management  Performed by  Anesthesiologist: Isaac Fernando MD  Preanesthetic Checklist  Completed: patient identified, IV checked, site marked, risks and benefits discussed, surgical consent, monitors and equipment checked, pre-op evaluation and timeout performed  Prep:  Pt Position: supine  Sterile barriers:cap, gloves and mask  Prep: ChloraPrep  Patient monitoring: blood pressure monitoring, continuous pulse oximetry and EKG  Procedure    Sedation: yes  Performed under: local infiltration  Guidance:ultrasound guided    ULTRASOUND INTERPRETATION.  Using ultrasound guidance a 21 G gauge needle was placed in close proximity to the nerve, at which point, under ultrasound guidance anesthetic was injected in the area of the nerve and spread of the anesthesia was seen on ultrasound in close proximity thereto.  There were no abnormalities seen on ultrasound; a digital image was taken; and the patient tolerated the procedure with no complications. Images:still images obtained, printed/placed on chart    Laterality:right  Block Type:interscalene  Injection Technique:single-shot  Needle Type:echogenic and Tuohy  Needle Gauge:21 G  Resistance on Injection: none    Medications Used: dexamethasone (DECADRON) injection - Injection   4 mg - 4/19/2023 6:30:00 AM  ropivacaine (NAROPIN) 0.5 % injection - Injection   24 mL - 4/19/2023 6:30:00 AM      Post Assessment  Injection Assessment: negative aspiration for heme, no paresthesia on injection and incremental injection  Patient Tolerance:comfortable throughout block  Complications:no  Additional Notes  Ultrasound guidance used to visualize nerve anatomy, guide needle placement and verify local  anesthetic disbursement.

## 2023-04-20 VITALS
OXYGEN SATURATION: 94 % | TEMPERATURE: 98 F | HEART RATE: 72 BPM | RESPIRATION RATE: 16 BRPM | SYSTOLIC BLOOD PRESSURE: 114 MMHG | DIASTOLIC BLOOD PRESSURE: 59 MMHG

## 2023-04-20 LAB
HCT VFR BLD AUTO: 40.9 % (ref 37.5–51)
HGB BLD-MCNC: 13.5 G/DL (ref 13–17.7)
INR PPP: 1.12 (ref 0.9–1.1)
PROTHROMBIN TIME: 14.5 SECONDS (ref 11.7–14.2)

## 2023-04-20 PROCEDURE — 85018 HEMOGLOBIN: CPT | Performed by: ORTHOPAEDIC SURGERY

## 2023-04-20 PROCEDURE — 85014 HEMATOCRIT: CPT | Performed by: ORTHOPAEDIC SURGERY

## 2023-04-20 PROCEDURE — 85610 PROTHROMBIN TIME: CPT | Performed by: ORTHOPAEDIC SURGERY

## 2023-04-20 PROCEDURE — G0378 HOSPITAL OBSERVATION PER HR: HCPCS

## 2023-04-20 PROCEDURE — 97110 THERAPEUTIC EXERCISES: CPT

## 2023-04-20 PROCEDURE — 97165 OT EVAL LOW COMPLEX 30 MIN: CPT

## 2023-04-20 PROCEDURE — 97535 SELF CARE MNGMENT TRAINING: CPT

## 2023-04-20 PROCEDURE — 94799 UNLISTED PULMONARY SVC/PX: CPT

## 2023-04-20 RX ORDER — OXYCODONE HYDROCHLORIDE AND ACETAMINOPHEN 5; 325 MG/1; MG/1
1 TABLET ORAL EVERY 4 HOURS PRN
Qty: 40 TABLET | Refills: 0 | Status: SHIPPED | OUTPATIENT
Start: 2023-04-20

## 2023-04-20 RX ADMIN — CLINDAMYCIN IN 5 PERCENT DEXTROSE 900 MG: 18 INJECTION, SOLUTION INTRAVENOUS at 05:37

## 2023-04-20 RX ADMIN — OXYCODONE AND ACETAMINOPHEN 1 TABLET: 5; 325 TABLET ORAL at 02:42

## 2023-04-20 RX ADMIN — BUDESONIDE AND FORMOTEROL FUMARATE DIHYDRATE 2 PUFF: 160; 4.5 AEROSOL RESPIRATORY (INHALATION) at 08:23

## 2023-04-20 RX ADMIN — OXYCODONE AND ACETAMINOPHEN 1 TABLET: 5; 325 TABLET ORAL at 11:03

## 2023-04-20 RX ADMIN — OXYCODONE AND ACETAMINOPHEN 2 TABLET: 5; 325 TABLET ORAL at 06:55

## 2023-04-20 RX ADMIN — LISINOPRIL 5 MG: 5 TABLET ORAL at 09:02

## 2023-04-20 RX ADMIN — CELECOXIB 200 MG: 200 CAPSULE ORAL at 09:01

## 2023-04-20 RX ADMIN — TIOTROPIUM BROMIDE INHALATION SPRAY 2 PUFF: 3.12 SPRAY, METERED RESPIRATORY (INHALATION) at 08:22

## 2023-04-20 RX ADMIN — MONTELUKAST SODIUM 10 MG: 10 TABLET, FILM COATED ORAL at 09:02

## 2023-04-20 RX ADMIN — DILTIAZEM HYDROCHLORIDE 240 MG: 240 CAPSULE, COATED, EXTENDED RELEASE ORAL at 09:02

## 2023-04-20 NOTE — PLAN OF CARE
Goal Outcome Evaluation:  Plan of Care Reviewed With: patient        Progress: improving  Outcome Evaluation: VSS, AOx4, pain well controlled with prn percocet, no acute issues prior to discharge home with spouse

## 2023-04-20 NOTE — CASE MANAGEMENT/SOCIAL WORK
Case Management Discharge Note      Final Note: dc'd home.         Selected Continued Care - Discharged on 4/20/2023 Admission date: 4/19/2023 - Discharge disposition: Home or Self Care    Destination    No services have been selected for the patient.              Durable Medical Equipment    No services have been selected for the patient.              Dialysis/Infusion    No services have been selected for the patient.              Home Medical Care    No services have been selected for the patient.              Therapy    No services have been selected for the patient.              Community Resources    No services have been selected for the patient.              Community & DME    No services have been selected for the patient.                  Transportation Services  Private: Car    Final Discharge Disposition Code: 01 - home or self-care   04-Aug-2020 07:44

## 2023-04-20 NOTE — PROGRESS NOTES
Date of Admission: 04/19/23  Date of Discharge:  4/20/2023    Admitting Diagnosis: S/P reverse total shoulder arthroplasty, right [Z96.611]  OA (osteoarthritis) [M19.90]  Discharge Diagnosis: Procedure(s):  RIGHT REVERSE TOTAL SHOULDER ARTHROPLASTY    Hospital Course  Patient is a 67 y.o. male presented with S/P reverse total shoulder arthroplasty, right [Z96.611]  OA (osteoarthritis) [M19.90]. The patient did well postoperatively. At the time of discharge, the patient was afebrile, tolerating a regular diet, having normal bowel function, and adequate pain control on p.o. pain medication only.  He was instructed in Codman exercises prior to discharge.    Procedures Performed  Procedure(s):  RIGHT REVERSE TOTAL SHOULDER ARTHROPLASTY        Disposition: Discharge to home today.      Followup: Follow up in a period of 2 weeks.     Discharge Medications: Percocet.     Follow-up Appointments  No future appointments.  [unfilled]     MOIZ Man MD  04/20/23  07:58 EDT

## 2023-04-20 NOTE — PROGRESS NOTES
Orthopedic Progress Note    Subjective     Post-Operative Day: 1 post-right reverse shoulder arthroplasty  Systemic or Specific Complaints: No Complaints, no problems with eating, bowel movements, voiding, or wound drainage and pain is well controlled    Objective     Vital signs in last 24 hours:  Temp:  [97.6 °F (36.4 °C)-98.7 °F (37.1 °C)] 98 °F (36.7 °C)  Heart Rate:  [] 64  Resp:  [16-24] 16  BP: (109-167)/() 114/59    General: alert, appears stated age and cooperative   Neurovascular:  Intact   Wound: Wound clean and dry no evidence of infection.   Range of Motion: Limited flexion     Data Review  CBC:  Results from last 7 days   Lab Units 04/20/23  0629   HEMOGLOBIN g/dL 13.5   HEMATOCRIT % 40.9       Assessment & Plan     Status post-right reverse shoulder arthroplasty: Doing well postoperatively.     Pain Relief: some relief    Discharge today, Return to Clinic: 8 days    Activity: out of bed and ambulate     LOS: 1 day     Mateusz Man MD    Date: 4/20/2023  Time: 07:57 EDT

## 2023-04-20 NOTE — PLAN OF CARE
The pt is POD 1 from a R reverse total shoulder arthroplasty. Two sessions occurred this AM as the pt was waiting for family to arrive to go over education. He was able to demonstrate sling management, technique for dressing as well as his post op exercises. SBA for all OOB mobility. He rated 5/10 pain this AM and ice/elevation encouraged. He plans to d/c home with his girlfriends assistance and OP PT.

## 2023-04-20 NOTE — THERAPY DISCHARGE NOTE
Acute Care - Occupational Therapy Discharge  [facility]    Patient Name: Edin Mariano  : 1955    MRN: [id number]                              Today's Date: 2023       Admit Date: 2023    Visit Dx: No diagnosis found.  Patient Active Problem List   Diagnosis   • OA (osteoarthritis) of knee   • S/P reverse total shoulder arthroplasty, right   • OA (osteoarthritis)     Past Medical History:   Diagnosis Date   • Anticoagulated with warfarin    • Arthritis    • Asthma    • Bipolar disorder    • COPD (chronic obstructive pulmonary disease)    • Drug addiction in remission     LAST USED 30 YEARS AGO   • History of seizure     30 YRS AGO WITH COMING OFF ADDICTIVE DRUGS   • Hypertension    • Knee pain    • Lung nodule    • Paroxysmal atrial fibrillation     HX CARDIOVERSION X 2   • Recovering alcoholic in remission     SOBER 30 YEARS   • Right shoulder pain    • Shortness of breath     FROM COPD   • Sleep apnea     CPAP   • Tremor of left hand      Past Surgical History:   Procedure Laterality Date   • APPENDECTOMY     • KNEE ARTHROPLASTY Right    • [place] ARTHRP KNE CONDYLE&PLATU MEDIAL&LAT COMPARTMENTS Left 10/18/2016    Procedure: LT TOTAL KNEE ARTHROPLASTY;  Surgeon: Grant Evans MD;  Location: [facility] OR;  Service: Orthopedics   • TOTAL SHOULDER ARTHROPLASTY W/ DISTAL CLAVICLE EXCISION Right 2023    Procedure: RIGHT REVERSE TOTAL SHOULDER ARTHROPLASTY;  Surgeon: Mateusz Man MD;  [place] [facility] [place] [facility]  Service: Orthopedics;  Laterality: Right;      General Information     Row Name 23 1410          OT Time and Intention    Document Type discharge evaluation/summary  two sessions this AM as the pt had a PICC line placed  -RB     Mode of Treatment individual therapy;occupational therapy  -RB     Row Name 23 1410          General Information    Patient Profile Reviewed yes  -RB     Prior Level of Function independent:;ADL's;transfer  -RB     Existing  Precautions/Restrictions fall;shoulder;non-weight bearing  -RB     Barriers to Rehab none identified  -RB     Row Name 04/20/23 1410          Living Environment    People in Home significant other;other (see comments)  they do not live together but the pt will have her assistance over the next week  -RB     Row Name 04/20/23 1410          Cognition    Orientation Status (Cognition) oriented x 3  -RB     Row Name 04/20/23 1410          Safety Issues, Functional Mobility    Impairments Affecting Function (Mobility) pain;strength;sensation/sensory awareness;range of motion (ROM)  -RB           User Key  (r) = Recorded By, (t) = Taken By, (c) = Cosigned By    Initials Name Provider Type    RB Traci Bright OT Occupational Therapist               Mobility/ADL's     Row Name 04/20/23 1411          Bed Mobility    Bed Mobility supine-sit  -RB     Supine-Sit Trigg (Bed Mobility) minimum assist (75% patient effort)  -RB     Comment, (Bed Mobility) Min A to roll R and scoot himself forward to position even on the EOB  -RB     Row Name 04/20/23 1411          Transfers    Transfers sit-stand transfer  -RB     Row Name 04/20/23 1411          Sit-Stand Transfer    Sit-Stand Trigg (Transfers) standby assist  -RB     Row Name 04/20/23 1411          Functional Mobility    Functional Mobility- Ind. Level standby assist  -RB     Row Name 04/20/23 1411          Activities of Daily Living    BADL Assessment/Intervention upper body dressing  -RB     Row Name 04/20/23 1411          Mobility    Extremity Weight-bearing Status right upper extremity  -RB     Right Upper Extremity (Weight-bearing Status) non weight-bearing (NWB)  -RB     Row Name 04/20/23 1411          Upper Body Dressing Assessment/Training    Trigg Level (Upper Body Dressing) upper body dressing skills;moderate assist (50% patient effort)  -RB     Position (Upper Body Dressing) supported sitting  -RB     Comment, (Upper Body Dressing) Mod A to  assist with his sling and donning/doffing a gown. home clothes had not arrived yet  -RB           User Key  (r) = Recorded By, (t) = Taken By, (c) = Cosigned By    Initials Name Provider Type    Traci Angulo OT Occupational Therapist               Obj/Interventions     Row Name 04/20/23 1412          Sensory Assessment (Somatosensory)    Sensory Assessment nerve block partially intact proximally  -     Row Name 04/20/23 1412          Vision Assessment/Intervention    Visual Impairment/Limitations WFL  -RB     [facility] Name 04/20/23 1412          Range of Motion Comprehensive    Comment, General Range of Motion RUE impaired elbow->shoulder. Distal RUE WFL  -[facility] Name 04/20/23 1412          Strength Comprehensive (MMT)    Comment, General Manual Muscle Testing (MMT) Assessment RUE post op weakness  -[facility] Name 04/20/23 1412          Motor Skills    Therapeutic Exercise --  The pt tolerated his HEP with hands on assist for critiquing his technique of movements - pendulums and bicep curls  -[facility] Name 04/20/23 1412          Balance    Comment, Balance SBA  -RB           User Key  (r) = Recorded By, (t) = Taken By, (c) = Cosigned By    Initials Name Provider Type    Traci Angulo OT Occupational Therapist               Goals/Plan     [facility] Name 04/20/23 1414          Bed Mobility Goal 1 (OT)    Activity/Assistive Device (Bed Mobility Goal 1, OT) bed mobility activities, all  -RB     Sutton Level/Cues Needed (Bed Mobility Goal 1, OT) supervision required  -RB     Time Frame (Bed Mobility Goal 1, OT) short term goal (STG);2 weeks  -RB     Progress/Outcomes (Bed Mobility Goal 1, OT) discharged from facility  -RB     Row Name 04/20/23 1414          Dressing Goal 1 (OT)    Activity/Device (Dressing Goal 1, OT) dressing skills, all  -RB     Sutton/Cues Needed (Dressing Goal 1, OT) supervision required  -RB     Time Frame (Dressing Goal 1, OT) short term goal (STG);2 weeks  -RB      Progress/Outcome (Dressing Goal 1, OT) discharged from facility  -RB     Row Name 04/20/23 1414          ROM Goal 1 (OT)    ROM Goal 1 (OT) The pt and his s/o will be independent with sling management and post op HEP  -RB     Time Frame (ROM Goal 1, OT) short term goal (STG);2 weeks  -RB     Progress/Outcome (ROM Goal 1, OT) discharged from facility  -     Row Name 04/20/23 1414          Therapy Assessment/Plan (OT)    Planned Therapy Interventions (OT) BADL retraining;occupation/activity based interventions;patient/caregiver education/training;ROM/therapeutic exercise  -RB           User Key  (r) = Recorded By, (t) = Taken By, (c) = Cosigned By    Initials Name Provider Type    RB Traci Bright, FRANKI Occupational Therapist               Clinical Impression     Row Name 04/20/23 1413          Pain Assessment    Pretreatment Pain Rating 6/10  -RB     Posttreatment Pain Rating 6/10  -RB     Pain Location - Side/Orientation Right  -RB     Pain Location - shoulder  -RB     Pain Intervention(s) Repositioned;Cold applied  -     Row Name 04/20/23 1413          Plan of Care Review    Plan of Care Reviewed With patient  -RB     Progress no change  -RB     Row Name 04/20/23 1413          Therapy Assessment/Plan (OT)    Criteria for Skilled Therapeutic Interventions Met (OT) no;other (see comments)  D/C home post session  -     Row Name 04/20/23 1413          Therapy Plan Review/Discharge Plan (OT)    Anticipated Discharge Disposition (OT) home with assist;home with outpatient therapy services  -     Row Name 04/20/23 1413          Vital Signs    O2 Delivery Pre Treatment room air  -RB     O2 Delivery Intra Treatment room air  -RB     O2 Delivery Post Treatment room air  -RB     Pre Patient Position Supine  -RB     Intra Patient Position Standing  -RB     Post Patient Position Sitting  -RB     Row Name 04/20/23 1413          Positioning and Restraints    Pre-Treatment Position in bed  -RB     Post Treatment Position  chair  -RB     In Chair notified nsg;reclined;sitting;call light within reach;encouraged to call for assist;exit alarm on;RUE elevated  -RB           User Key  (r) = Recorded By, (t) = Taken By, (c) = Cosigned By    Initials Name Provider Type    RB Traci Bright OT Occupational Therapist               Outcome Measures     Row Name 04/20/23 1415          How much help from another is currently needed...    Putting on and taking off regular lower body clothing? 2  -RB     Bathing (including washing, rinsing, and drying) 2  -RB     Toileting (which includes using toilet bed pan or urinal) 3  -RB     Putting on and taking off regular upper body clothing 2  -RB     Taking care of personal grooming (such as brushing teeth) 3  -RB     Eating meals 3  -RB     AM-PAC 6 Clicks Score (OT) 15  -RB     Row Name 04/20/23 0815          How much help from another person do you currently need...    Turning from your back to your side while in flat bed without using bedrails? 3  -BB     Moving from lying on back to sitting on the side of a flat bed without bedrails? 3  -BB     Moving to and from a bed to a chair (including a wheelchair)? 3  -BB     Standing up from a chair using your arms (e.g., wheelchair, bedside chair)? 3  -BB     Climbing 3-5 steps with a railing? 3  -BB     To walk in hospital room? 3  -BB     AM-PAC 6 Clicks Score (PT) 18  -BB     Highest level of mobility 6 --> Walked 10 steps or more  -BB     Row Name 04/20/23 1415          Modified Mini Scale    Modified Mini Scale 3 - Moderate disability.  Requiring some help, but able to walk without assistance.  -RB     Row Name 04/20/23 1415          Functional Assessment    Outcome Measure Options AM-PAC 6 Clicks Daily Activity (OT);Modified [place]  -RB           User Key  (r) = Recorded By, (t) = Taken By, (c) = Cosigned By    Initials Name Provider Type    Traci Angulo OT Occupational Therapist    Marie Mistry RN Registered Nurse               Occupational Therapy Education     Title: PT OT SLP Therapies (In Progress)     Topic: Occupational Therapy (Not Started)     Point: ADL training (Not Started)     Description:   Instruct learner(s) on proper safety adaptation and remediation techniques during self care or transfers.   Instruct in proper use of assistive devices.              Learner Progress:  Not documented in this visit.          Point: Home exercise program (Not Started)     Description:   Instruct learner(s) on appropriate technique for monitoring, assisting and/or progressing therapeutic exercises/activities.              Learner Progress:  Not documented in this visit.          Point: Precautions (Not Started)     Description:   Instruct learner(s) on prescribed precautions during self-care and functional transfers.              Learner Progress:  Not documented in this visit.          Point: Body mechanics (Not Started)     Description:   Instruct learner(s) on proper positioning and spine alignment during self-care, functional mobility activities and/or exercises.              Learner Progress:  Not documented in this visit.                          OT Recommendation and Plan  Planned Therapy Interventions (OT): BADL retraining, occupation/activity based interventions, patient/caregiver education/training, ROM/therapeutic exercise  Plan of Care Review  Plan of Care Reviewed With: patient  Progress: no change  Plan of Care Reviewed With: patient     Time Calculation:    Time Calculation- OT     Row Name 04/20/23 1410 04/20/23 1409          Time Calculation- OT    OT Start Time 0911  -RB 1115  -RB     OT Stop Time 0922  -RB 1133  -RB     OT Time Calculation (min) 11 min  -RB 18 min  -RB     Total Timed Code Minutes- OT -- 8 minute(s)  -RB     OT Received On 04/20/23  -RB 04/20/23  -RB        Timed Charges    51366 - OT Therapeutic Exercise Minutes 11  -RB 11  -RB     69928 - OT Self Care/Mgmt Minutes -- 8  -RB        Untimed Charges    OT  Eval/Re-eval Minutes -- 10  -RB        Total Minutes    Timed Charges Total Minutes 11  -RB 19  -RB     Untimed Charges Total Minutes -- 10  -RB      Total Minutes 11  -RB 29  -RB           User Key  (r) = Recorded By, (t) = Taken By, (c) = Cosigned By    Initials Name Provider Type    RB Traci Bright OT Occupational Therapist              Therapy Charges for Today     Code Description Service Date Service Provider Modifiers Qty    [id number]  OT THER PROC EA 15 MIN 4/20/2023 Traci Bright OT GO 1    [id number]  OT SELF CARE/MGMT/TRAIN EA 15 MIN 4/20/2023 Traci Bright OT GO 1    [id number]  OT EVAL LOW COMPLEXITY 2 4/20/2023 Traci Bright OT GO 1             OT Discharge Summary  Anticipated Discharge Disposition (OT): home with assist, home with outpatient therapy services    Traci Bright OT  4/20/2023

## 2023-04-26 ENCOUNTER — TELEPHONE (OUTPATIENT)
Dept: ORTHOPEDIC SURGERY | Facility: HOSPITAL | Age: 68
End: 2023-04-26
Payer: COMMERCIAL

## 2023-04-26 NOTE — TELEPHONE ENCOUNTER
Mr. Mariano called me back at this time. He said he is doing really well. He hasn't had any pain and is off the medication. He takes Ibuprofen as needed. He is doing the exercises. Incision looks good no drainage or issues. He has a F/U with the MD 5/4. He did ask when he could take a shower. Instructions read that was provided by MD in AVS. Mr. Mariano doesn't have any other questions for me at this time. He has my contact information should he need anything.

## 2023-04-26 NOTE — TELEPHONE ENCOUNTER
Attempted to reach Mr. Mariano to see how he is doing as he is 1 week SP [facility]TS[facility]. Message left at this time.

## (undated) DEVICE — PROXIMATE RH ROTATING HEAD SKIN STAPLERS (35 WIDE) CONTAINS 35 STAINLESS STEEL STAPLES: Brand: PROXIMATE

## (undated) DEVICE — MAT FLR ABSORBENT LG 4FT 10 2.5FT

## (undated) DEVICE — GLV SURG BIOGEL LTX PF 8

## (undated) DEVICE — TRAP FLD MINIVAC MEGADYNE 100ML

## (undated) DEVICE — BIT DRL SCRW PERIPH 2.7MM

## (undated) DEVICE — PATIENT RETURN ELECTRODE, SINGLE-USE, CONTACT QUALITY MONITORING, ADULT, WITH 9FT CORD, FOR PATIENTS WEIGING OVER 33LBS. (15KG): Brand: MEGADYNE

## (undated) DEVICE — HANDPIECE SET WITH COAXIAL HIGH FLOW TIP AND SUCTION TUBE: Brand: INTERPULSE

## (undated) DEVICE — SUT VIC 0 CT1 36IN J946H

## (undated) DEVICE — SUT VIC 2/0 X1 27IN J459H

## (undated) DEVICE — PK SHLDR OPN 40

## (undated) DEVICE — IMMOB SHLDR PAD2 UNIV LG

## (undated) DEVICE — IMPLANTABLE DEVICE
Type: IMPLANTABLE DEVICE | Site: SHOULDER | Status: NON-FUNCTIONAL
Brand: COMPREHENSIVE® REVERSE SHOULDER
Removed: 2023-04-19

## (undated) DEVICE — TBG PENCL TELESCP MEGADYNE SMOKE EVAC 10FT

## (undated) DEVICE — DRSNG GZ PETROLTM XEROFORM CURAD 1X8IN STRL

## (undated) DEVICE — BNDG ELAS CO-FLEX SLF ADHR 6IN 5YD LF STRL

## (undated) DEVICE — SKIN PREP TRAY W/CHG: Brand: MEDLINE INDUSTRIES, INC.

## (undated) DEVICE — DUAL CUT SAGITTAL BLADE

## (undated) DEVICE — SHEET, DRAPE, SPLIT, STERILE: Brand: MEDLINE

## (undated) DEVICE — GLV SURG SIGNATURE ESSENTIAL PF LTX SZ8